# Patient Record
Sex: FEMALE
[De-identification: names, ages, dates, MRNs, and addresses within clinical notes are randomized per-mention and may not be internally consistent; named-entity substitution may affect disease eponyms.]

---

## 2022-11-21 PROBLEM — Z00.00 ENCOUNTER FOR PREVENTIVE HEALTH EXAMINATION: Status: ACTIVE | Noted: 2022-11-21

## 2022-12-21 ENCOUNTER — APPOINTMENT (OUTPATIENT)
Dept: ORTHOPEDIC SURGERY | Facility: CLINIC | Age: 67
End: 2022-12-21

## 2022-12-21 DIAGNOSIS — M17.10 UNILATERAL PRIMARY OSTEOARTHRITIS, UNSPECIFIED KNEE: ICD-10-CM

## 2022-12-21 PROCEDURE — 73560 X-RAY EXAM OF KNEE 1 OR 2: CPT | Mod: 50

## 2022-12-21 PROCEDURE — 99204 OFFICE O/P NEW MOD 45 MIN: CPT

## 2022-12-21 NOTE — HISTORY OF PRESENT ILLNESS
[de-identified] : Patient is here for follow-up on a fall on both knees anteriorly and that is the source of her pain she points to that she is about 60% better she is doing a Voltaren gels on the knees she has not done any physical therapy pain with activities is 8/10 at rest is 5/10 and injury occurred on October 1, 2022 she does take Crestor comes in with her  today\par \par On exam she is got some patellofemoral crepitus good range of motion no for no effusion bilaterally stable negative Homans sign\par \par X-rays taken today standing AP and laterals left and right knees show bone-on-bone medial compartment of the left knee,, Kellgren, Kellgren Marvin grade 1 on the right knee,\par  recommend continue the Voltaren gels cream to both knees as well as starting therapy recommend weight loss with the proper portion of eating a food , healthy choices of food, and eating till satisfied but not full, she will follow up in 2 months if he is still symptomatic would consider cortisone shots if that fails gel injections

## 2023-02-22 ENCOUNTER — APPOINTMENT (OUTPATIENT)
Dept: ORTHOPEDIC SURGERY | Facility: CLINIC | Age: 68
End: 2023-02-22

## 2025-02-20 ENCOUNTER — OUTPATIENT (OUTPATIENT)
Dept: OUTPATIENT SERVICES | Facility: HOSPITAL | Age: 70
LOS: 1 days | End: 2025-02-20
Payer: MEDICARE

## 2025-02-20 DIAGNOSIS — R06.02 SHORTNESS OF BREATH: ICD-10-CM

## 2025-02-20 DIAGNOSIS — Z00.8 ENCOUNTER FOR OTHER GENERAL EXAMINATION: ICD-10-CM

## 2025-02-20 PROCEDURE — 75574 CT ANGIO HRT W/3D IMAGE: CPT | Mod: 26

## 2025-02-20 PROCEDURE — 75574 CT ANGIO HRT W/3D IMAGE: CPT

## 2025-02-21 DIAGNOSIS — R06.02 SHORTNESS OF BREATH: ICD-10-CM
